# Patient Record
Sex: MALE | Race: WHITE | NOT HISPANIC OR LATINO | Employment: UNEMPLOYED | URBAN - METROPOLITAN AREA
[De-identification: names, ages, dates, MRNs, and addresses within clinical notes are randomized per-mention and may not be internally consistent; named-entity substitution may affect disease eponyms.]

---

## 2017-09-19 ENCOUNTER — HOSPITAL ENCOUNTER (EMERGENCY)
Facility: MEDICAL CENTER | Age: 32
End: 2017-09-20
Attending: EMERGENCY MEDICINE
Payer: MEDICAID

## 2017-09-19 VITALS
BODY MASS INDEX: 24.23 KG/M2 | OXYGEN SATURATION: 96 % | HEIGHT: 71 IN | RESPIRATION RATE: 18 BRPM | WEIGHT: 173.06 LBS | DIASTOLIC BLOOD PRESSURE: 75 MMHG | SYSTOLIC BLOOD PRESSURE: 127 MMHG | HEART RATE: 78 BPM | TEMPERATURE: 99.1 F

## 2017-09-19 DIAGNOSIS — E03.9 ACQUIRED HYPOTHYROIDISM: ICD-10-CM

## 2017-09-19 DIAGNOSIS — E03.9 HYPOTHYROIDISM: ICD-10-CM

## 2017-09-19 PROCEDURE — 99284 EMERGENCY DEPT VISIT MOD MDM: CPT

## 2017-09-19 RX ORDER — LEVOTHYROXINE SODIUM 175 UG/1
175 TABLET ORAL
COMMUNITY
End: 2018-04-07

## 2017-09-19 RX ORDER — LEVOTHYROXINE SODIUM 0.12 MG/1
175 TABLET ORAL
Qty: 30 TAB | Refills: 3 | Status: SHIPPED | OUTPATIENT
Start: 2017-09-19 | End: 2018-04-07

## 2017-09-19 ASSESSMENT — PAIN SCALES - GENERAL: PAINLEVEL_OUTOF10: 0

## 2017-09-20 NOTE — DISCHARGE INSTRUCTIONS
Hypothyroidism  Hypothyroidism is a condition due to under activity of the thyroid gland.  CAUSES   Hypothyroidism may be due to thyroid surgery or disease.   SYMPTOMS   Symptoms are often vague. They may include:  · Fatigue.   · Mental dullness.   · Weakness.   · Hoarseness.   · Constipation.   · Swelling in the face, hands or feet.   · Dry skin.   · Hair loss.   · Sensitivity to cold.   · Menstrual problems.   · Patients with hypothyroidism are also more likely to have problems with infections.   DIAGNOSIS   The diagnosis is confirmed by lab tests for levels of thyroid hormones (TSH, T3, and T4). These tests are also used to monitor treatment.  TREATMENT   Treatment includes the gradual replacement of thyroid hormones. It is very important that you take your thyroid medicine as prescribed daily, even after you begin to feel better. You will probably require thyroid medicine for the rest of your life. To reduce constipation, eat a diet high in fruits and vegetables. Try to get some exercise every day. Please see your doctor for follow up care as recommended so your treatment can be adjusted as your condition improves.   SEEK IMMEDIATE MEDICAL CARE IF:   You have chest pain, palpitations, shortness of breath, or any other serious symptoms.  Document Released: 01/25/2006 Document Revised: 03/11/2013 Document Reviewed: 12/18/2006  Revision3® Patient Information ©2013 Revision3, [facility].

## 2017-09-20 NOTE — DISCHARGE PLANNING
Printed [place] resources for obtaining Medicaid for pt.    Please call SW prior to d/c.

## 2017-09-20 NOTE — ED NOTES
.  Chief Complaint   Patient presents with   • Medication Refill     Ran out of levothyroxine.  Unable to get appt with PCP.  New to [facility].     Patient educated on triage process and wait time.  Instructed to notify staff for worsening or new symptoms.  Placed in lobby.

## 2017-09-20 NOTE — ED PROVIDER NOTES
"ED Provider Note    CHIEF COMPLAINT  Chief Complaint   Patient presents with   • Medication Refill     Ran out of levothyroxine.  Unable to get appt with PCP.  New to [facility].       MARTITA  Neil Mcallister II is a 32 y.o. male who presentsTo the emergency department asking for a refill on his Synthroid. The patient has hypothyroidism and is been out of his medication he's been unable to establish a primary care doctor. He is a symptomatically at this time.    REVIEW OF SYSTEMS  No recent fevers    PHYSICAL EXAM  VITAL SIGNS: /75   Pulse 78   Temp 37.3 °C (99.1 °F) (Temporal)   Resp 18   Ht 1.803 m (5' 11\")   Wt 78.5 kg (173 lb 1 oz)   SpO2 96%   BMI 24.14 kg/m²   In general the patient does not appear toxic  Pulmonary chest clear to auscultation bilaterally  Cardiovascular S1 and S2 with a regular rate and rhythm  GI abdomen soft    COURSE & MEDICAL DECISION MAKING  Pertinent Labs & Imaging studies reviewed. (See chart for details)  This a 32-year-old gentleman who needs a refill on his Synthroid for his hypothyroidism. Therefore a prescription has been generated and the patient be discharged with instructions to establish a primary care provider.    FINAL IMPRESSION  1. Hypothyroidism    Disposition  The patient will be discharged in stable condition      Electronically signed by: Ck Hoover, 9/19/2017 11:15 PM      "

## 2017-09-20 NOTE — ED NOTES
Seen by ERP in Triage 1.  Patient has no medical complaints, just wanted refill of script.     Explained discharge instructions to patient with all questions answered.  Patient encouraged to follow up with PCP and establish care with a PCP, and return to the ER for worsening symptoms.  VSS upon discharge.  Patient ambulated to lobby with steady gait.  Patient confirmed that he had a safe way to get home.

## 2018-03-21 ENCOUNTER — HOSPITAL ENCOUNTER (EMERGENCY)
Dept: HOSPITAL 8 - ED | Age: 33
Discharge: HOME | End: 2018-03-21
Payer: MEDICAID

## 2018-03-21 VITALS — SYSTOLIC BLOOD PRESSURE: 117 MMHG | DIASTOLIC BLOOD PRESSURE: 69 MMHG

## 2018-03-21 VITALS — WEIGHT: 172.4 LBS | HEIGHT: 71 IN | BODY MASS INDEX: 24.14 KG/M2

## 2018-03-21 DIAGNOSIS — K08.89: Primary | ICD-10-CM

## 2018-03-21 DIAGNOSIS — Z88.1: ICD-10-CM

## 2018-03-21 PROCEDURE — 99283 EMERGENCY DEPT VISIT LOW MDM: CPT

## 2018-03-23 ENCOUNTER — HOSPITAL ENCOUNTER (EMERGENCY)
Dept: HOSPITAL 8 - ED | Age: 33
Discharge: HOME | End: 2018-03-23
Payer: MEDICAID

## 2018-03-23 VITALS — DIASTOLIC BLOOD PRESSURE: 73 MMHG | SYSTOLIC BLOOD PRESSURE: 124 MMHG

## 2018-03-23 VITALS — WEIGHT: 171.3 LBS | BODY MASS INDEX: 23.98 KG/M2 | HEIGHT: 71 IN

## 2018-03-23 DIAGNOSIS — K04.7: Primary | ICD-10-CM

## 2018-03-23 DIAGNOSIS — K02.9: ICD-10-CM

## 2018-03-23 DIAGNOSIS — E03.9: ICD-10-CM

## 2018-03-23 PROCEDURE — 96372 THER/PROPH/DIAG INJ SC/IM: CPT

## 2018-03-23 PROCEDURE — 99283 EMERGENCY DEPT VISIT LOW MDM: CPT

## 2018-04-08 ENCOUNTER — HOSPITAL ENCOUNTER (INPATIENT)
Dept: HOSPITAL 8 - ED | Age: 33
LOS: 2 days | Discharge: HOME | DRG: 579 | End: 2018-04-10
Attending: FAMILY MEDICINE | Admitting: INTERNAL MEDICINE
Payer: MEDICAID

## 2018-04-08 VITALS — WEIGHT: 170.86 LBS | BODY MASS INDEX: 23.92 KG/M2 | HEIGHT: 71 IN

## 2018-04-08 VITALS — SYSTOLIC BLOOD PRESSURE: 119 MMHG | DIASTOLIC BLOOD PRESSURE: 75 MMHG

## 2018-04-08 DIAGNOSIS — K04.7: ICD-10-CM

## 2018-04-08 DIAGNOSIS — K02.9: ICD-10-CM

## 2018-04-08 DIAGNOSIS — L03.211: Primary | ICD-10-CM

## 2018-04-08 DIAGNOSIS — F11.10: ICD-10-CM

## 2018-04-08 DIAGNOSIS — H05.019: ICD-10-CM

## 2018-04-08 DIAGNOSIS — N17.0: ICD-10-CM

## 2018-04-08 DIAGNOSIS — K01.1: ICD-10-CM

## 2018-04-08 DIAGNOSIS — E89.0: ICD-10-CM

## 2018-04-08 DIAGNOSIS — K12.2: ICD-10-CM

## 2018-04-08 DIAGNOSIS — Z85.850: ICD-10-CM

## 2018-04-08 DIAGNOSIS — D64.9: ICD-10-CM

## 2018-04-08 DIAGNOSIS — L03.213: ICD-10-CM

## 2018-04-08 DIAGNOSIS — D69.6: ICD-10-CM

## 2018-04-08 LAB
ALBUMIN SERPL-MCNC: 3.3 G/DL (ref 3.4–5)
ALP SERPL-CCNC: 66 U/L (ref 45–117)
ALT SERPL-CCNC: 15 U/L (ref 12–78)
ANION GAP SERPL CALC-SCNC: 7 MMOL/L (ref 5–15)
BASOPHILS # BLD AUTO: 0.05 X10^3/UL (ref 0–0.1)
BASOPHILS NFR BLD AUTO: 1 % (ref 0–1)
BILIRUB SERPL-MCNC: 0.5 MG/DL (ref 0.2–1)
CALCIUM SERPL-MCNC: 8.1 MG/DL (ref 8.5–10.1)
CHLORIDE SERPL-SCNC: 107 MMOL/L (ref 98–107)
CREAT SERPL-MCNC: 2.13 MG/DL (ref 0.7–1.3)
EOSINOPHIL # BLD AUTO: 0.14 X10^3/UL (ref 0–0.4)
EOSINOPHIL NFR BLD AUTO: 2 % (ref 1–7)
ERYTHROCYTE [DISTWIDTH] IN BLOOD BY AUTOMATED COUNT: 12.8 % (ref 9.4–14.8)
LYMPHOCYTES # BLD AUTO: 0.98 X10^3/UL (ref 1–3.4)
LYMPHOCYTES NFR BLD AUTO: 15 % (ref 22–44)
MCH RBC QN AUTO: 30.3 PG (ref 27.5–34.5)
MCHC RBC AUTO-ENTMCNC: 34.7 G/DL (ref 33.2–36.2)
MCV RBC AUTO: 87.1 FL (ref 81–97)
MD: NO
MONOCYTES # BLD AUTO: 0.69 X10^3/UL (ref 0.2–0.8)
MONOCYTES NFR BLD AUTO: 11 % (ref 2–9)
NEUTROPHILS # BLD AUTO: 4.53 X10^3/UL (ref 1.8–6.8)
NEUTROPHILS NFR BLD AUTO: 71 % (ref 42–75)
PLATELET # BLD AUTO: 129 X10^3/UL (ref 130–400)
PMV BLD AUTO: 8.6 FL (ref 7.4–10.4)
PROT SERPL-MCNC: 6.6 G/DL (ref 6.4–8.2)
RBC # BLD AUTO: 3.98 X10^6/UL (ref 4.38–5.82)

## 2018-04-08 PROCEDURE — 80048 BASIC METABOLIC PNL TOTAL CA: CPT

## 2018-04-08 PROCEDURE — 96375 TX/PRO/DX INJ NEW DRUG ADDON: CPT

## 2018-04-08 PROCEDURE — 70481 CT ORBIT/EAR/FOSSA W/DYE: CPT

## 2018-04-08 PROCEDURE — 36415 COLL VENOUS BLD VENIPUNCTURE: CPT

## 2018-04-08 PROCEDURE — 80076 HEPATIC FUNCTION PANEL: CPT

## 2018-04-08 PROCEDURE — 85025 COMPLETE CBC W/AUTO DIFF WBC: CPT

## 2018-04-08 PROCEDURE — 96361 HYDRATE IV INFUSION ADD-ON: CPT

## 2018-04-08 PROCEDURE — 70486 CT MAXILLOFACIAL W/O DYE: CPT

## 2018-04-08 PROCEDURE — 70100 X-RAY EXAM OF JAW <4VIEWS: CPT

## 2018-04-08 PROCEDURE — 41800 DRAINAGE OF GUM LESION: CPT

## 2018-04-08 PROCEDURE — 96365 THER/PROPH/DIAG IV INF INIT: CPT

## 2018-04-08 PROCEDURE — 80053 COMPREHEN METABOLIC PANEL: CPT

## 2018-04-08 RX ADMIN — SODIUM CHLORIDE SCH MLS/HR: 0.9 INJECTION, SOLUTION INTRAVENOUS at 21:58

## 2018-04-08 RX ADMIN — AMPICILLIN SODIUM AND SULBACTAM SODIUM SCH MLS/HR: 2; 1 INJECTION, POWDER, FOR SOLUTION INTRAMUSCULAR; INTRAVENOUS at 21:58

## 2018-04-08 RX ADMIN — ACETAMINOPHEN PRN MG: 325 TABLET, FILM COATED ORAL at 23:27

## 2018-04-09 VITALS — DIASTOLIC BLOOD PRESSURE: 77 MMHG | SYSTOLIC BLOOD PRESSURE: 127 MMHG

## 2018-04-09 VITALS — SYSTOLIC BLOOD PRESSURE: 111 MMHG | DIASTOLIC BLOOD PRESSURE: 58 MMHG

## 2018-04-09 VITALS — SYSTOLIC BLOOD PRESSURE: 112 MMHG | DIASTOLIC BLOOD PRESSURE: 73 MMHG

## 2018-04-09 VITALS — SYSTOLIC BLOOD PRESSURE: 121 MMHG | DIASTOLIC BLOOD PRESSURE: 73 MMHG

## 2018-04-09 VITALS — SYSTOLIC BLOOD PRESSURE: 124 MMHG | DIASTOLIC BLOOD PRESSURE: 65 MMHG

## 2018-04-09 LAB
ALBUMIN SERPL-MCNC: 3 G/DL (ref 3.4–5)
ALP SERPL-CCNC: 69 U/L (ref 45–117)
ALT SERPL-CCNC: 27 U/L (ref 12–78)
ANION GAP SERPL CALC-SCNC: 8 MMOL/L (ref 5–15)
BASOPHILS # BLD AUTO: 0.02 X10^3/UL (ref 0–0.1)
BASOPHILS NFR BLD AUTO: 0 % (ref 0–1)
BILIRUB SERPL-MCNC: 0.5 MG/DL (ref 0.2–1)
CALCIUM SERPL-MCNC: 8 MG/DL (ref 8.5–10.1)
CHLORIDE SERPL-SCNC: 109 MMOL/L (ref 98–107)
CREAT SERPL-MCNC: 1.51 MG/DL (ref 0.7–1.3)
EOSINOPHIL # BLD AUTO: 0.14 X10^3/UL (ref 0–0.4)
EOSINOPHIL NFR BLD AUTO: 2 % (ref 1–7)
ERYTHROCYTE [DISTWIDTH] IN BLOOD BY AUTOMATED COUNT: 12.9 % (ref 9.4–14.8)
LYMPHOCYTES # BLD AUTO: 1.03 X10^3/UL (ref 1–3.4)
LYMPHOCYTES NFR BLD AUTO: 16 % (ref 22–44)
MCH RBC QN AUTO: 29.9 PG (ref 27.5–34.5)
MCHC RBC AUTO-ENTMCNC: 34.5 G/DL (ref 33.2–36.2)
MCV RBC AUTO: 86.8 FL (ref 81–97)
MD: NO
MONOCYTES # BLD AUTO: 0.77 X10^3/UL (ref 0.2–0.8)
MONOCYTES NFR BLD AUTO: 12 % (ref 2–9)
NEUTROPHILS # BLD AUTO: 4.32 X10^3/UL (ref 1.8–6.8)
NEUTROPHILS NFR BLD AUTO: 69 % (ref 42–75)
PLATELET # BLD AUTO: 120 X10^3/UL (ref 130–400)
PMV BLD AUTO: 9 FL (ref 7.4–10.4)
PROT SERPL-MCNC: 6.4 G/DL (ref 6.4–8.2)
RBC # BLD AUTO: 3.92 X10^6/UL (ref 4.38–5.82)

## 2018-04-09 PROCEDURE — 0W930ZZ DRAINAGE OF ORAL CAVITY AND THROAT, OPEN APPROACH: ICD-10-PCS | Performed by: DENTIST

## 2018-04-09 PROCEDURE — 0CDXXZ1 EXTRACTION OF LOWER TOOTH, MULTIPLE, EXTERNAL APPROACH: ICD-10-PCS | Performed by: DENTIST

## 2018-04-09 PROCEDURE — 0NUV0KZ SUPPLEMENT LEFT MANDIBLE WITH NONAUTOLOGOUS TISSUE SUBSTITUTE, OPEN APPROACH: ICD-10-PCS | Performed by: DENTIST

## 2018-04-09 RX ADMIN — DOCUSATE SODIUM 50MG AND SENNOSIDES 8.6MG SCH TAB: 8.6; 5 TABLET, FILM COATED ORAL at 09:55

## 2018-04-09 RX ADMIN — AMPICILLIN SODIUM AND SULBACTAM SODIUM SCH MLS/HR: 2; 1 INJECTION, POWDER, FOR SOLUTION INTRAMUSCULAR; INTRAVENOUS at 03:30

## 2018-04-09 RX ADMIN — AMPICILLIN SODIUM AND SULBACTAM SODIUM SCH MLS/HR: 2; 1 INJECTION, POWDER, FOR SOLUTION INTRAMUSCULAR; INTRAVENOUS at 15:37

## 2018-04-09 RX ADMIN — ACETAMINOPHEN PRN MG: 325 TABLET, FILM COATED ORAL at 21:30

## 2018-04-09 RX ADMIN — ACETAMINOPHEN PRN MG: 325 TABLET, FILM COATED ORAL at 03:31

## 2018-04-09 RX ADMIN — SODIUM CHLORIDE SCH MLS/HR: 0.9 INJECTION, SOLUTION INTRAVENOUS at 14:30

## 2018-04-09 RX ADMIN — FENTANYL CITRATE PRN MCG: 50 INJECTION INTRAMUSCULAR; INTRAVENOUS at 19:10

## 2018-04-09 RX ADMIN — BUPRENORPHINE HYDROCHLORIDE, NALOXONE HYDROCHLORIDE SCH EACH: 8; 2 FILM, SOLUBLE BUCCAL; SUBLINGUAL at 09:57

## 2018-04-09 RX ADMIN — FENTANYL CITRATE PRN MCG: 50 INJECTION INTRAMUSCULAR; INTRAVENOUS at 19:35

## 2018-04-09 RX ADMIN — AMPICILLIN SODIUM AND SULBACTAM SODIUM SCH MLS/HR: 2; 1 INJECTION, POWDER, FOR SOLUTION INTRAMUSCULAR; INTRAVENOUS at 09:55

## 2018-04-09 RX ADMIN — SODIUM CHLORIDE SCH MLS/HR: 0.9 INJECTION, SOLUTION INTRAVENOUS at 03:30

## 2018-04-09 RX ADMIN — LEVOTHYROXINE SODIUM SCH MCG: 200 TABLET ORAL at 09:56

## 2018-04-09 RX ADMIN — FENTANYL CITRATE PRN MCG: 50 INJECTION INTRAMUSCULAR; INTRAVENOUS at 19:18

## 2018-04-09 RX ADMIN — FENTANYL CITRATE PRN MCG: 50 INJECTION INTRAMUSCULAR; INTRAVENOUS at 19:23

## 2018-04-09 RX ADMIN — AMPICILLIN SODIUM AND SULBACTAM SODIUM SCH MLS/HR: 2; 1 INJECTION, POWDER, FOR SOLUTION INTRAMUSCULAR; INTRAVENOUS at 21:29

## 2018-04-09 RX ADMIN — CHLORHEXIDINE GLUCONATE SCH ML: 1.2 RINSE ORAL at 21:22

## 2018-04-09 RX ADMIN — OXYCODONE HYDROCHLORIDE PRN MG: 5 TABLET ORAL at 22:19

## 2018-04-10 VITALS — SYSTOLIC BLOOD PRESSURE: 114 MMHG | DIASTOLIC BLOOD PRESSURE: 55 MMHG

## 2018-04-10 VITALS — DIASTOLIC BLOOD PRESSURE: 64 MMHG | SYSTOLIC BLOOD PRESSURE: 112 MMHG

## 2018-04-10 VITALS — SYSTOLIC BLOOD PRESSURE: 124 MMHG | DIASTOLIC BLOOD PRESSURE: 61 MMHG

## 2018-04-10 LAB
ANION GAP SERPL CALC-SCNC: 8 MMOL/L (ref 5–15)
CALCIUM SERPL-MCNC: 8 MG/DL (ref 8.5–10.1)
CHLORIDE SERPL-SCNC: 108 MMOL/L (ref 98–107)
CREAT SERPL-MCNC: 1 MG/DL (ref 0.7–1.3)

## 2018-04-10 RX ADMIN — OXYCODONE HYDROCHLORIDE PRN MG: 5 TABLET ORAL at 11:01

## 2018-04-10 RX ADMIN — ACETAMINOPHEN PRN MG: 325 TABLET, FILM COATED ORAL at 02:50

## 2018-04-10 RX ADMIN — AMPICILLIN SODIUM AND SULBACTAM SODIUM SCH MLS/HR: 2; 1 INJECTION, POWDER, FOR SOLUTION INTRAMUSCULAR; INTRAVENOUS at 02:52

## 2018-04-10 RX ADMIN — BUPRENORPHINE HYDROCHLORIDE, NALOXONE HYDROCHLORIDE SCH EACH: 8; 2 FILM, SOLUBLE BUCCAL; SUBLINGUAL at 09:00

## 2018-04-10 RX ADMIN — ACETAMINOPHEN PRN MG: 325 TABLET, FILM COATED ORAL at 13:24

## 2018-04-10 RX ADMIN — SODIUM CHLORIDE SCH MLS/HR: 0.9 INJECTION, SOLUTION INTRAVENOUS at 02:52

## 2018-04-10 RX ADMIN — CHLORHEXIDINE GLUCONATE SCH ML: 1.2 RINSE ORAL at 09:52

## 2018-04-10 RX ADMIN — OXYCODONE HYDROCHLORIDE PRN MG: 5 TABLET ORAL at 04:27

## 2018-04-10 RX ADMIN — DOCUSATE SODIUM 50MG AND SENNOSIDES 8.6MG SCH TAB: 8.6; 5 TABLET, FILM COATED ORAL at 09:48

## 2018-04-10 RX ADMIN — ACETAMINOPHEN PRN MG: 325 TABLET, FILM COATED ORAL at 08:23

## 2018-04-10 RX ADMIN — LEVOTHYROXINE SODIUM SCH MCG: 200 TABLET ORAL at 04:27

## 2018-04-10 RX ADMIN — SODIUM CHLORIDE SCH MLS/HR: 0.9 INJECTION, SOLUTION INTRAVENOUS at 11:16

## 2018-04-10 RX ADMIN — AMPICILLIN SODIUM AND SULBACTAM SODIUM SCH MLS/HR: 2; 1 INJECTION, POWDER, FOR SOLUTION INTRAMUSCULAR; INTRAVENOUS at 09:54

## 2019-10-07 PROBLEM — F32.9 MAJOR DEPRESSIVE DISORDER: Status: ACTIVE | Noted: 2019-10-07

## 2020-08-24 PROBLEM — F11.90 OPIOID USE DISORDER: Status: ACTIVE | Noted: 2020-08-24

## 2021-06-10 ENCOUNTER — APPOINTMENT (OUTPATIENT)
Dept: RADIOLOGY | Facility: MEDICAL CENTER | Age: 36
End: 2021-06-10
Attending: EMERGENCY MEDICINE
Payer: COMMERCIAL

## 2021-06-10 ENCOUNTER — HOSPITAL ENCOUNTER (EMERGENCY)
Facility: MEDICAL CENTER | Age: 36
End: 2021-06-10
Attending: EMERGENCY MEDICINE
Payer: COMMERCIAL

## 2021-06-10 VITALS
WEIGHT: 175 LBS | TEMPERATURE: 97.8 F | OXYGEN SATURATION: 91 % | HEIGHT: 71 IN | RESPIRATION RATE: 18 BRPM | SYSTOLIC BLOOD PRESSURE: 110 MMHG | DIASTOLIC BLOOD PRESSURE: 58 MMHG | HEART RATE: 61 BPM | BODY MASS INDEX: 24.5 KG/M2

## 2021-06-10 DIAGNOSIS — L08.9: ICD-10-CM

## 2021-06-10 DIAGNOSIS — S30.851A: ICD-10-CM

## 2021-06-10 PROCEDURE — 307744 HCHG RED TRAUMA TEAM SERVICES

## 2021-06-10 PROCEDURE — 96375 TX/PRO/DX INJ NEW DRUG ADDON: CPT

## 2021-06-10 PROCEDURE — 700111 HCHG RX REV CODE 636 W/ 250 OVERRIDE (IP): Performed by: EMERGENCY MEDICINE

## 2021-06-10 PROCEDURE — 99285 EMERGENCY DEPT VISIT HI MDM: CPT

## 2021-06-10 PROCEDURE — 73501 X-RAY EXAM HIP UNI 1 VIEW: CPT | Mod: RT

## 2021-06-10 PROCEDURE — 96365 THER/PROPH/DIAG IV INF INIT: CPT

## 2021-06-10 RX ORDER — ACETAMINOPHEN 500 MG
1000 TABLET ORAL EVERY 8 HOURS
Qty: 60 TABLET | Refills: 0 | Status: SHIPPED | OUTPATIENT
Start: 2021-06-10 | End: 2021-06-20

## 2021-06-10 RX ORDER — NAPROXEN 500 MG/1
500 TABLET ORAL 2 TIMES DAILY WITH MEALS
Qty: 60 TABLET | Refills: 0 | Status: SHIPPED | OUTPATIENT
Start: 2021-06-10 | End: 2021-06-10 | Stop reason: SDUPTHER

## 2021-06-10 RX ORDER — ACETAMINOPHEN 500 MG
1000 TABLET ORAL EVERY 8 HOURS
Qty: 60 TABLET | Refills: 0 | Status: SHIPPED | OUTPATIENT
Start: 2021-06-10 | End: 2021-06-10 | Stop reason: SDUPTHER

## 2021-06-10 RX ORDER — NAPROXEN 500 MG/1
500 TABLET ORAL 2 TIMES DAILY WITH MEALS
Qty: 60 TABLET | Refills: 0 | Status: SHIPPED | OUTPATIENT
Start: 2021-06-10 | End: 2022-05-02

## 2021-06-10 RX ORDER — CEFAZOLIN SODIUM 2 G/100ML
INJECTION, SOLUTION INTRAVENOUS
Status: COMPLETED | OUTPATIENT
Start: 2021-06-10 | End: 2021-06-10

## 2021-06-10 RX ORDER — KETOROLAC TROMETHAMINE 30 MG/ML
INJECTION, SOLUTION INTRAMUSCULAR; INTRAVENOUS
Status: COMPLETED | OUTPATIENT
Start: 2021-06-10 | End: 2021-06-10

## 2021-06-10 RX ADMIN — KETOROLAC TROMETHAMINE 30 MG: 30 INJECTION, SOLUTION INTRAMUSCULAR at 12:56

## 2021-06-10 RX ADMIN — CEFAZOLIN SODIUM 2 G: 2 INJECTION, SOLUTION INTRAVENOUS at 12:54

## 2021-06-10 ASSESSMENT — LIFESTYLE VARIABLES: DO YOU DRINK ALCOHOL: NO

## 2021-06-10 NOTE — LETTER
"  FORM C-4:  EMPLOYEE’S CLAIM FOR COMPENSATION/ REPORT OF INITIAL TREATMENT  EMPLOYEE’S CLAIM - PROVIDE ALL INFORMATION REQUESTED   First Name Neil Last Name Esvin Birthdate 1985  Sex male Claim Number   Home Address [address]   [facility] [facility]             [place] [address]                                   Age  36 y.o. Height  1.803 m (5' 11\") Weight  79.4 kg (175 lb) N  [id number]   Mailing Address [address]  [facility]              Zip [address] Telephone  [phone] (home)  Primary Language Spoken   Insurer  *** Third Party   CHELSIE MONTELONGO Employee's Occupation (Job Title) When Injury or Occupational Disease Occurred  [employer]   Employer's Name  Telephone [phone]    Employer Address [address] [facility] [address]] Zip [address]   Date of Injury  6/10/2021       Hour of Injury  12:30 PM Date Employer Notified  6/10/2021 Last Day of Work after Injury or Occupational Disease  6/10/2021 Supervisor to Whom Injury Reported  Cory Hoffman   Address or Location of Accident (if applicable) Work [1]   What were you doing at the time of accident? (if applicable) Nailing in [place]    How did this injury or occupational disease occur? Be specific and answer in detail. Use additional sheet if necessary)  i droped the nail gun and when I caught it I accidently Pulled the trigger   If you believe that you have an occupational disease, when did you first have knowledge of the disability and it relationship to your employment? N/A Witnesses to the Accident  George   Nature of Injury or Occupational Disease  Workers' Compensation Part(s) of Body Injured or Affected  Abdomen Including Groin, Soft Tissue, N/A    I CERTIFY THAT THE ABOVE IS TRUE AND CORRECT TO THE BEST OF MY KNOWLEDGE AND THAT I HAVE PROVIDED THIS INFORMATION IN ORDER TO OBTAIN THE BENEFITS OF [place] " OR CHAPTER [place]).  I HEREBY AUTHORIZE ANY PHYSICIAN, CHIROPRACTOR, SURGEON, PRACTITIONER, OR OTHER PERSON, ANY HOSPITAL, INCLUDING [facility] OR [facility], ANY MEDICAL SERVICE ORGANIZATION, ANY INSURANCE COMPANY, OR OTHER INSTITUTION OR ORGANIZATION TO RELEASE TO EACH OTHER, ANY MEDICAL OR OTHER INFORMATION, INCLUDING BENEFITS PAID OR PAYABLE, PERTINENT TO THIS INJURY OR DISEASE, EXCEPT INFORMATION RELATIVE TO DIAGNOSIS, TREATMENT AND/OR COUNSELING FOR AIDS, PSYCHOLOGICAL CONDITIONS, ALCOHOL OR CONTROLLED SUBSTANCES, FOR WHICH I MUST GIVE SPECIFIC AUTHORIZATION.  A PHOTOSTAT OF THIS AUTHORIZATION SHALL BE AS VALID AS THE ORIGINAL.  Date                                      Place                                                                             Employee’s Signature   THIS REPORT MUST BE COMPLETED AND MAILED WITHIN 3 WORKING DAYS OF TREATMENT   Place [facility], EMERGENCY DEPT                       Name of Facility [facility]   Date  6/10/2021 Diagnosis  (S30.851A,  L08.9) Superficial foreign body groin without major open wound with infection, initial encounter Is there evidence the injured employee was under the influence of alcohol and/or another controlled substance at the time of accident?   Hour  1:46 PM Description of Injury or Disease  Superficial foreign body groin without major open wound with infection, initial encounter     Treatment     Have you advised the patient to remain off work five days or more?             X-Ray Findings    If Yes   From Date    To Date      From information given by the employee, together with medical evidence, can you directly connect this injury or occupational disease as job incurred?   If No, is employee capable of: Full Duty    Modified Duty      Is additional medical care by a physician indicated?   If Modified Duty, Specify any Limitations / Restrictions       Do you know of any previous injury  "or disease contributing to this condition or occupational disease?      Date 6/10/2021 Print Doctor’s Name Gilda Rendon I certify the employer’s copy of this form was mailed on:   Address [address] [place] [address]  [place] [address] [phone] INSURER’S USE ONLY   Provider’s Tax ID Number   Telephone Dept: [phone]    Doctor’s Signature   Degree        Form C-4 (rev.10/07)                                                                         BRIEF DESCRIPTION OF RIGHTS AND BENEFITS  (Pursuant to [place] 616C.050)    Notice of Injury or Occupational Disease (Incident Report Form C-1): If an injury or occupational disease (OD) arises out of and in the course of employment, you must provide written notice to your employer as soon as practicable, but no later than 7 days after the accident or OD. Your employer shall maintain a sufficient supply of the required forms.    Claim for Compensation (Form C-4): If medical treatment is sought, the form C-4 is available at the place of initial treatment. A completed \"Claim for Compensation\" (Form C-4) must be filed within 90 days after an accident or OD. The treating physician or chiropractor must, within 3 working days after treatment, complete and mail to the employer, the employer's insurer and third-party , the Claim for Compensation.    Medical Treatment: If you require medical treatment for your on-the-job injury or OD, you may be required to select a physician or chiropractor from a list provided by your workers’ compensation insurer, if it has contracted with an Organization for Managed Care (MCO) or Preferred Provider Organization (PPO) or providers of health care. If your employer has not entered into a contract with an MCO or PPO, you may select a physician or chiropractor from the Panel of Physicians and Chiropractors. Any medical costs related to your industrial injury or OD will be paid by your insurer.    Temporary Total Disability (TTD): If your doctor " has certified that you are unable to work for a period of at least 5 consecutive days, or 5 cumulative days in a 20-day period, or places restrictions on you that your employer does not accommodate, you may be entitled to TTD compensation.    Temporary Partial Disability (TPD): If the wage you receive upon reemployment is less than the compensation for TTD to which you are entitled, the insurer may be required to pay you TPD compensation to make up the difference. TPD can only be paid for a maximum of 24 months.    Permanent Partial Disability (PPD): When your medical condition is stable and there is an indication of a PPD as a result of your injury or OD, within 30 days, your insurer must arrange for an evaluation by a rating physician or chiropractor to determine the degree of your PPD. The amount of your PPD award depends on the date of injury, the results of the PPD evaluation, your age and wage.    Permanent Total Disability (PTD): If you are medically certified by a treating physician or chiropractor as permanently and totally disabled and have been granted a PTD status by your insurer, you are entitled to receive monthly benefits not to exceed 66 2/3% of your average monthly wage. The amount of your PTD payments is subject to reduction if you previously received a lump-sum PPD award.    Vocational Rehabilitation Services: You may be eligible for vocational rehabilitation services if you are unable to return to the job due to a permanent physical impairment or permanent restrictions as a result of your injury or occupational disease.    Transportation and Per Brie Reimbursement: You may be eligible for travel expenses and per brie associated with medical treatment.    Reopening: You may be able to reopen your claim if your condition worsens after claim closure.     Appeal Process: If you disagree with a written determination issued by the insurer or the insurer does not respond to your request, you may appeal  to the Department of Administration, , by following the instructions contained in your determination letter. You must appeal the determination within 70 days from the date of the determination letter at [address], [address], [place], [place] [address], or [address], [address], [place], [place] [address]. If you disagree with the  decision, you may appeal to the Department of Administration, . You must file your appeal within 30 days from the date of the  decision letter at [address], [address], [place], [place] [address], or [address], [address], [place], [place] [address]. If you disagree with a decision of an , you may file a petition for judicial review with the District Court. You must do so within 30 days of the Appeal Officer’s decision. You may be represented by an  at your own expense or you may contact the [facility] for possible representation.    [place]  for Injured Workers (NAIW): If you disagree with a  decision, you may request that NAIW represent you without charge at an  Hearing. For information regarding denial of benefits, you may contact the [facility] at: [address], [address], [place] [address], [phone], or [address], [address], [place] [address], [phone]    To File a Complaint with the Division: If you wish to file a complaint with the  of the Division of Industrial Relations (DIR),  please contact the Workers’ Compensation Section, [address], [address], [place], [place] [address], telephone [phone], or [address], [address], [place], [place] [address], telephone [phone].    For assistance with Workers’ Compensation Issues: You may contact the [facility] Office for Consumer Health Assistance, [address], [address], [place], [place] [address],  Toll Free 1-266.205.7332, Web site: http://[facility].[place].gov/Programs/DELPHINE E-mail: delphine@[facility].[place].gov  D-2 (rev. 10/20)              __________________________________________________________________                                    _________________            Employee Name / Signature                                                                                                                            Date

## 2021-06-10 NOTE — ED PROVIDER NOTES
ED Provider Note    Scribed for Gilda Rendon M.D. by Ana Pearson. 6/10/2021, 12:43 PM.    Primary care provider: FANNY Bhakta  Means of arrival: Walk-in  History obtained from: Patient  History limited by: None    CHIEF COMPLAINT  Chief Complaint   Patient presents with    Trauma Red     nail gun to R thigh     HPI  Neil Mcallister II is a 36 y.o. male who presents as a trauma red to the Emergency Department. The patient was using a nail gun and discharged a Lionel nail into his right groin. The nail is still in his groin. Denies any other injuries. His tetanus was lasted updated 3 years ago. Denies tobacco, alcohol, or drug use. Denies allergies to medications.     REVIEW OF SYSTEMS  Musculoskeletal: Foreign body in right groin. No numbness or tingling to the right leg. No bleeding or hematoma    See history of present illness. All other systems are negative.    PAST MEDICAL HISTORY   has a past medical history of Anxiety and depression, Cancer ([facility]), Cellulitis of hand, Dermatitis, GERD (gastroesophageal reflux disease), H/O: substance abuse ([facility]) (2014), History of thyroid cancer, Injury of left foot, Parathyroid disorder ([facility]), Postsurgical hypothyroidism, Right foot injury, Snoring, and Staph infection.    SURGICAL HISTORY   has a past surgical history that includes thyroidectomy; finger or hand incision and drainage (11/28/2013); irrigation & debridement ortho (3/6/2014); incision and drainage orthopedic (3/18/2014); incision and drainage orthopedic (3/20/2014); irrigation & debridement ortho (4/22/2014); incision and drainage general (11/4/2014); abdominal exploration; eye surgery; open reduction; and arthroplasty.    SOCIAL HISTORY  Social History     Tobacco Use    Smoking status: Former Smoker     Packs/day: 0.50     Years: 5.00     Pack years: 2.50     Types: Cigarettes    Smokeless tobacco: Former User     Types: Chew   Vaping Use    Vaping Use: Every day   Substance Use Topics     "Alcohol use: No    Drug use: Not Currently     Types: Methamphetamines     Comment: Heroin      Social History     Substance and Sexual Activity   Drug Use Not Currently    Types: Methamphetamines    Comment: Heroin     FAMILY HISTORY  Family History   Problem Relation Age of Onset    Alcohol/Drug Father         sober     CURRENT MEDICATIONS  Home Medications       Reviewed by Annie Triplett R.N. (Registered Nurse) on 06/10/21 at 1303  Med List Status: Partial     Medication Last Dose Status   levothyroxine (SYNTHROID) 200 MCG Tab  Active   Naloxone (NARCAN) 4 MG/0.1ML Liquid  Active   omeprazole (PRILOSEC) 20 MG Tablet Delayed Response delayed-release tablet  Active                  ALLERGIES  Allergies   Allergen Reactions    Erythromycin Rash     rash    Vicodin [Hydrocodone-Acetaminophen] Rash     Rash      Azithromycin Itching     Turn red.  States the said he got red man syndrome         PHYSICAL EXAM  VITAL SIGNS: /82   Pulse 82   Temp 36.6 °C (97.8 °F) (Temporal)   Resp 20   Ht 1.803 m (5' 11\")   Wt 79.4 kg (175 lb)   SpO2 92%   BMI 24.41 kg/m²     Constitutional: GCS 15, ABC's intact   Eyes: PERRLA, EOMI, Conjunctiva normal, No discharge.   Cardiovascular: Normal heart rate, Normal rhythm, No murmurs, No rubs, No gallops.   Thorax & Lungs: Normal breath sounds, No respiratory distress, No wheezing, No chest tenderness. No subcutaneous emphysema or paradoxical chest wall movements  Abdomen: Bowel sounds normal, Soft, No tenderness, No masses, No pulsatile masses, no abdominal wall contusions  Skin: Warm, Dry, No erythema, No rash no contusions or abrasions small puncture wound to right groin that is not bleeding  Extremities: Intact distal pulses, No edema, No tenderness, No cyanosis, No clubbing.   Musculoskeletal: Small puncture wound to the lateral anterior thigh, below the inguinal area. Anterior thigh otherwise normal. No expanding hematoma. No hard vascular signs. Distal CSM intake.  "   Neurologic: Alert & oriented x 3, Normal motor function, Normal sensory function, No focal deficits noted. Normal strength.     RADIOLOGY  DX-HIP-UNILATERAL-WITH PELVIS-1 VIEW RIGHT   Final Result      1.  Linear metallic foreign body along the anterior lateral aspect of the hip at the level of the greater trochanter.      US-ABORTED US PROCEDURE    (Results Pending)     The radiologist's interpretation of all radiological studies have been reviewed by me.    COURSE & MEDICAL DECISION MAKING  Pertinent Labs & Imaging studies reviewed. (See chart for details)    12:43 PM - Patient seen and examined in the trauma bay. Patient will be treated with Toradol injection and Ancef IVPB. Ordered US of right groin and DX-Hip Unilateral-Right to evaluate his symptoms. X-ray of the pelvis and thigh showed foreign body.     12:57 PM - Per discussion with trauma surgeon and the patient, the nail will stay in.     1:00 PM - Discussed home meds with the patient at this time. I discussed plan of discharge and strict return precautions for any new or worsening symptoms. The patient verbalizes agreement and understands.    The patient will return for new or worsening symptoms and is stable at the time of discharge.    The patient is referred to a primary physician for blood pressure management, diabetic screening, and for all other preventative health concerns.    DISPOSITION:  Patient will be discharged home in stable condition.    FOLLOW UP:  Paulino Lomax M.D.  [address] [place] [id number] [phone]    In 1 week  As needed, If symptoms worsen    OUTPATIENT MEDICATIONS:  Discharge Medication List as of 6/10/2021  2:10 PM        START taking these medications    Details   naproxen (NAPROSYN) 500 MG Tab Take 1 tablet by mouth 2 times a day with meals., Disp-60 tablet, R-0, Normal      acetaminophen (TYLENOL) 500 MG Tab Take 2 Tablets by mouth every 8 hours for 10 days., Disp-60 tablet, R-0, Normal           FINAL  IMPRESSION  1. Superficial foreign body groin without major open wound with infection, initial encounter        Ana LIRA (Scribe), am scribing for, and in the presence of, Gilda Rendon M.D..    Electronically signed by: Ana Pearson (Scribroxy), 6/10/2021    Gilda LIRA M.D. personally performed the services described in this documentation, as scribed by Ana Pearson in my presence, and it is both accurate and complete.    C.     The note accurately reflects work and decisions made by me.  Gilda Rendon M.D.  6/10/2021  4:04 PM

## 2021-06-10 NOTE — ED TRIAGE NOTES
Neil Mcallister II  36 y.o.  Chief Complaint   Patient presents with   • Trauma Red     nail gun to R thigh     Ambulatory to ED with steady gait for above. A & O x 4, GCS 15. Mask in place.    Injuries:  -R upper medial thigh wound open to air, bleeding controlled PTA with direct pressure    RLE CMS intact.    Tdap up to date per patient.

## 2021-06-10 NOTE — LETTER
"  FORM C-4:  EMPLOYEE’S CLAIM FOR COMPENSATION/ REPORT OF INITIAL TREATMENT  EMPLOYEE’S CLAIM - PROVIDE ALL INFORMATION REQUESTED   First Name Neil Last Name Esvin Birthdate 1985  Sex male Claim Number   Home Address [address]   [facility]             Zip [address]                                   Age  36 y.o. Height  1.803 m (5' 11\") Weight  79.4 kg (175 lb) [facility]     Mailing Address [address]  [facility]              Zip [address] Telephone  [phone] (home)  Primary Language Spoken  English   Insurer   Third Party    Employee's Occupation (Job Title) When Injury or Occupational Disease Occurred  [employer]   Employer's Name [employer] [place] Telephone [phone]    Employer Address [address] [facility] [address]] Zip [address]   Date of Injury  6/10/2021       Hour of Injury  12:30 PM Date Employer Notified  6/10/2021 Last Day of Work after Injury or Occupational Disease  6/10/2021 Supervisor to Whom Injury Reported  Cory Hoffman   Address or Location of Accident (if applicable) Work [1]   What were you doing at the time of accident? (if applicable) Nailing in [place]    How did this injury or occupational disease occur? Be specific and answer in detail. Use additional sheet if necessary)  i droped the nail gun and when I caught it I accidently Pulled the trigger   If you believe that you have an occupational disease, when did you first have knowledge of the disability and it relationship to your employment? N/A Witnesses to the Accident  George   Nature of Injury or Occupational Disease  Workers' Compensation Part(s) of Body Injured or Affected  Abdomen Including Groin, Soft Tissue, N/A    I CERTIFY THAT THE ABOVE IS TRUE AND CORRECT TO THE BEST OF MY KNOWLEDGE AND THAT I HAVE PROVIDED THIS INFORMATION IN ORDER TO OBTAIN THE BENEFITS OF [place]).  I HEREBY AUTHORIZE ANY PHYSICIAN, CHIROPRACTOR, SURGEON, PRACTITIONER, OR OTHER PERSON, ANY HOSPITAL, INCLUDING [facility] OR [facility], ANY MEDICAL SERVICE ORGANIZATION, ANY INSURANCE COMPANY, OR OTHER INSTITUTION OR ORGANIZATION TO RELEASE TO EACH OTHER, ANY MEDICAL OR OTHER INFORMATION, INCLUDING BENEFITS PAID OR PAYABLE, PERTINENT TO THIS INJURY OR DISEASE, EXCEPT INFORMATION RELATIVE TO DIAGNOSIS, TREATMENT AND/OR COUNSELING FOR AIDS, PSYCHOLOGICAL CONDITIONS, ALCOHOL OR CONTROLLED SUBSTANCES, FOR WHICH I MUST GIVE SPECIFIC AUTHORIZATION.  A PHOTOSTAT OF THIS AUTHORIZATION SHALL BE AS VALID AS THE ORIGINAL.  Date   06.10.2021                          Place             [facility]           Employee’s Signature   THIS REPORT MUST BE COMPLETED AND MAILED WITHIN 3 WORKING DAYS OF TREATMENT   Place [facility], EMERGENCY DEPT                       Name of Facility [facility]   Date  6/10/2021 Diagnosis  (S30.851A,  L08.9) Superficial foreign body groin without major open wound with infection, initial encounter Is there evidence the injured employee was under the influence of alcohol and/or another controlled substance at the time of accident?   Hour  2:05 PM Description of Injury or Disease  Superficial foreign body groin without major open wound with infection, initial encounter No   Treatment  Xray, ancef, toradol  Have you advised the patient to remain off work five days or more?         No   X-Ray Findings  Positive If Yes   From Date    To Date      From information given by the employee, together with medical evidence, can you directly connect this injury or occupational disease as job incurred? Yes If No, is employee capable of: Full Duty  No Modified Duty  Yes   Is additional medical care by a physician indicated? Yes  Comments:occ health If Modified Duty, Specify any Limitations /  "Restrictions   decreased weight bearing right leg   Do you know of any previous injury or disease contributing to this condition or occupational disease? No    Date 6/10/2021 Print Doctor’s Name Janett Rendon I certify the employer’s copy of this form was mailed on:   Address [address] [place] [address]  [employer] [id number] [phone] INSURER’S USE ONLY   Provider’s Tax ID Number   Telephone Dept: [phone]    Doctor’s Signature e-JANETT Malave M.D. Degree  M.D      Form C-4 (rev.10/07)                                                                         BRIEF DESCRIPTION OF RIGHTS AND BENEFITS  (Pursuant to [place] 616[place])    Notice of Injury or Occupational Disease (Incident Report Form C-1): If an injury or occupational disease (OD) arises out of and in the course of employment, you must provide written notice to your employer as soon as practicable, but no later than 7 days after the accident or OD. Your employer shall maintain a sufficient supply of the required forms.    Claim for Compensation (Form C-4): If medical treatment is sought, the form C-4 is available at the place of initial treatment. A completed \"Claim for Compensation\" (Form C-4) must be filed within 90 days after an accident or OD. The treating physician or chiropractor must, within 3 working days after treatment, complete and mail to the employer, the employer's insurer and third-party , the Claim for Compensation.    Medical Treatment: If you require medical treatment for your on-the-job injury or OD, you may be required to select a physician or chiropractor from a list provided by your workers’ compensation insurer, if it has contracted with an Organization for Managed Care (MCO) or Preferred Provider Organization (PPO) or providers of health care. If your employer has not entered into a contract with an MCO or PPO, you may select a physician or chiropractor from the Panel of Physicians and Chiropractors. Any medical costs related " to your industrial injury or OD will be paid by your insurer.    Temporary Total Disability (TTD): If your doctor has certified that you are unable to work for a period of at least 5 consecutive days, or 5 cumulative days in a 20-day period, or places restrictions on you that your employer does not accommodate, you may be entitled to TTD compensation.    Temporary Partial Disability (TPD): If the wage you receive upon reemployment is less than the compensation for TTD to which you are entitled, the insurer may be required to pay you TPD compensation to make up the difference. TPD can only be paid for a maximum of 24 months.    Permanent Partial Disability (PPD): When your medical condition is stable and there is an indication of a PPD as a result of your injury or OD, within 30 days, your insurer must arrange for an evaluation by a rating physician or chiropractor to determine the degree of your PPD. The amount of your PPD award depends on the date of injury, the results of the PPD evaluation, your age and wage.    Permanent Total Disability (PTD): If you are medically certified by a treating physician or chiropractor as permanently and totally disabled and have been granted a PTD status by your insurer, you are entitled to receive monthly benefits not to exceed 66 2/3% of your average monthly wage. The amount of your PTD payments is subject to reduction if you previously received a lump-sum PPD award.    Vocational Rehabilitation Services: You may be eligible for vocational rehabilitation services if you are unable to return to the job due to a permanent physical impairment or permanent restrictions as a result of your injury or occupational disease.    Transportation and Per Brie Reimbursement: You may be eligible for travel expenses and per brie associated with medical treatment.    Reopening: You may be able to reopen your claim if your condition worsens after claim closure.     Appeal Process: If you disagree  with a written determination issued by the insurer or the insurer does not respond to your request, you may appeal to the Department of Administration, , by following the instructions contained in your determination letter. You must appeal the determination within 70 days from the date of the determination letter at [address], [address], [place], [place] [address], or [address], [address], [place], [place] [address]. If you disagree with the  decision, you may appeal to the Department of Administration, . You must file your appeal within 30 days from the date of the  decision letter at [address], [address], [place], [place] [address], or [address], [address], [place], [place] [address]. If you disagree with a decision of an , you may file a petition for judicial review with the District Court. You must do so within 30 days of the Appeal Officer’s decision. You may be represented by an  at your own expense or you may contact the [facility] for possible representation.    [place]  for Injured Workers (NAIW): If you disagree with a  decision, you may request that NAIW represent you without charge at an  Hearing. For information regarding denial of benefits, you may contact the [facility] at: [address], [address], [place] [address], [phone], or [address], [address], [place] [address], [phone]    To File a Complaint with the Division: If you wish to file a complaint with the  of the Division of Industrial Relations (DIR),  please contact the Workers’ Compensation Section, [address], [place], [place] [address], telephone [phone], or [address], [address], [place], [place] [address], telephone [phone].    For assistance with Workers’ Compensation Issues: You may contact the  [facility][1] Office for Consumer Health Assistance, [address] [facility] [facility], [address], [place] [address], Toll Free 1-222.620.4714, Web site: http://[facility] access hospital.[place].gov/Programs/DELPHINE E-mail: [email or web address]@[email or web address].nv.gov  D-2 (rev. 10/20)              __________________________________________________________________                                    _06.10.2021_________            Employee Name / Signature                                                                                                                            Date

## 2021-06-10 NOTE — H&P
Trauma Surgery History and Physical    Chief Complaint: Nail gun injury.     History of Present Illness: The patient is a 36 year-old White man who accidentally fired an 18 gauge courtney nail into his lateral right groin . He complains of pain the right lateral groin region.    Triage Category: The patient was triaged as a Trauma Red activation. An expeditious primary and secondary survey with required adjuncts was conducted. See Trauma Narrator for full details.    Past Medical History:  has a past medical history of Anxiety and depression, Cancer ([facility]), Cellulitis of hand, Dermatitis, GERD (gastroesophageal reflux disease), H/O: substance abuse ([facility]) (2014), History of thyroid cancer, Injury of left foot, Parathyroid disorder ([facility]), Postsurgical hypothyroidism, Right foot injury, Snoring, and Staph infection. He also has no past medical history of Addisons disease ([facility]), Adrenal disorder ([facility]), Allergy, Anemia, Anesthesia, Arrhythmia, Arthritis, ASTHMA, Blood transfusion without reported diagnosis, Breath shortness, Bronchitis, CA in situ resp sys, CATARACT, Clotting disorder ([facility]), Cold, Congestive heart failure ([facility]), COPD (chronic obstructive pulmonary disease) ([facility]), Coughing blood, Cushings syndrome ([facility]), Dental disorder, Diabetes, Diabetic neuropathy ([facility]), Dialysis, EMPHYSEMA, Glaucoma, Goiter, Head ache, Heart murmur, Heart valve disease, Hepatitis A, Hepatitis B, Hepatitis C, Hiatus hernia syndrome, HIV (human immunodeficiency virus infection) ([facility]), Hyperlipidemia, Hypertension, IBD (inflammatory bowel disease), Meningitis, Migraine, Muscle disorder, Myocardial infarct ([facility]), Obstruction, Osteoporosis, Other specified disorder of intestines, Personal history of venous thrombosis and embolism, Pituitary disease ([facility]), Pneumonia, Renal disorder, Rheumatic fever, Seizure ([facility]), Sickle cell disease ([facility]), Sleep apnea, Stroke ([facility]), Tuberculosis, Unspecified hemorrhagic conditions, Unspecified urinary  "incontinence, Urinary bladder disorder, or Urinary tract infection.    Past Surgical History:  has a past surgical history that includes thyroidectomy; finger or hand incision and drainage (11/28/2013); irrigation & debridement ortho (3/6/2014); incision and drainage orthopedic (3/18/2014); incision and drainage orthopedic (3/20/2014); irrigation & debridement ortho (4/22/2014); incision and drainage general (11/4/2014); abdominal exploration; eye surgery; open reduction; and arthroplasty.    Allergies:   Allergies   Allergen Reactions   • Erythromycin Rash     rash   • Vicodin [Hydrocodone-Acetaminophen] Rash     Rash     • Azithromycin Itching     Turn red.  States the said he got red man syndrome           Current Medications:    Home Medications     Reviewed by Annie Triplett R.N. (Registered Nurse) on 06/10/21 at 1303  Med List Status: Partial   Medication Last Dose Status   levothyroxine (SYNTHROID) 200 MCG Tab  Active   Naloxone (NARCAN) 4 MG/0.1ML Liquid  Active   omeprazole (PRILOSEC) 20 MG Tablet Delayed Response delayed-release tablet  Active                Family History: family history includes Alcohol/Drug in his father.    Social History:  reports that he has quit smoking. His smoking use included cigarettes. He has a 2.50 pack-year smoking history. He has quit using smokeless tobacco.  His smokeless tobacco use included chew. He reports previous drug use. Drug: Methamphetamines. He reports that he does not drink alcohol.    Review of Systems: Comprehensive review of systems is negative with the exception of the aforementioned HPI, PMH, and PSH bullets in accordance with CMS guidelines.    Physical Examination:     Constitutional:     Vital Signs: /82   Pulse 82   Temp 36.6 °C (97.8 °F) (Temporal)   Resp 20   Ht 1.803 m (5' 11\")   Wt 79.4 kg (175 lb)   SpO2 92%    General Appearance: The patient is a uncomfortable-appearing man in no critical distress.  HEENT:    No significant external " craniofacial trauma. The pupils are equal, round, and reactive to light bilaterally. The extraocular muscles are intact bilaterally. The ear canals and tympanic membranes are clear bilaterally. The nares and oropharynx are clear. The midface and jaw are stable.  No malocclusion is evident.  Neck:    No posterior midline cervical-spine tenderness, no evidence of intoxication, normal level of alertness ([place] Coma Scale 15), no focal neurologic deficit, and no painful distracting injuries. The trachea is midline. No significant abrasions, lacerations, contusions, punctures, or swelling. No crepitance. No jugulovenous distension.  Respiratory:   Inspection: Unlabored respirations, no intercostal retractions, paradoxical motion, or accessory muscle use.   Palpation:  The chest is nontender. The clavicles are non deformed bilaterally.   Auscultation: clear to auscultation.  Cardiovascular:   Inspection: The skin is warm, dry and well purfused.  Auscultation: Regular rate and rhythm.   Peripheral Pulses: Normal. No hard vascular signs.   Abdomen:   Inspection: Abdominal inspection reveals no abrasions, contusions, lacerations or penetrating wounds.   Palpation: Palpation is remarkable for no significant tenderness, guarding, or peritoneal findings.  Genitourinary:   (MALE): normal male external genitalia.  Musculoskeletal:   The pelvis is stable. No significant angulation, deformity, or soft tissue injury involving the upper and lower extremities. Pinpoint courtney nail entry in the lateral right groin. No hematoma or bruit.  Back:   The thoracolumbar spine was examined utilizing spinal motion restriction. Examination is remarkable for no significant tenderness, swelling, or deformity in the thoracolumbar region.  Skin:    Examination of the skin reveals no significant abrasions, contusion, lacerations, or other soft tissue injury.  Neurologic:    [place] Coma Scale (GCS) Eye Opening (spontaneous 4), Verbal Response  (oriented 5), Best Motor Response (obeys commands 6). GCS 15.    Neurologic examination reveals no focal deficits noted, mental status intact, cranial nerves II through XII intact, muscle tone normal, muscle strength normal, sensation is normal.  Psychiatric:   The patient oriented to time, place, person, judgement and insight appear intact.    Imaging:   DX-HIP-UNILATERAL-WITH PELVIS-1 VIEW RIGHT   Final Result      1.  Linear metallic foreign body along the anterior lateral aspect of the hip at the level of the greater trochanter.      US-ABORTED US PROCEDURE    (Results Pending)       Assessment and Plan:     Small foreign body, right lateral groin without neurovascular compromise.   Non operative management unless the patient develops signs of infection or persistent focal discomfort.    Disposition: Home.       ____________________________________     Paulino Lomax M.D.    DD: 6/10/2021  1:35 PM

## 2022-04-26 PROBLEM — S83.511A NEW ACL TEAR, RIGHT, INITIAL ENCOUNTER: Status: ACTIVE | Noted: 2022-04-26

## 2023-07-05 PROBLEM — G47.33 OBSTRUCTIVE SLEEP APNEA SYNDROME: Status: ACTIVE | Noted: 2021-04-13

## 2023-07-05 PROBLEM — K21.9 GASTROESOPHAGEAL REFLUX DISEASE WITHOUT ESOPHAGITIS: Status: ACTIVE | Noted: 2023-03-28

## 2023-07-05 PROBLEM — M70.31 BURSITIS OF RIGHT ELBOW: Status: ACTIVE | Noted: 2023-05-22

## 2023-07-05 PROBLEM — F90.2 ATTENTION DEFICIT HYPERACTIVITY DISORDER (ADHD), COMBINED TYPE: Status: ACTIVE | Noted: 2023-03-28

## 2023-07-05 PROBLEM — E53.8 VITAMIN B12 DEFICIENCY (NON ANEMIC): Status: ACTIVE | Noted: 2021-08-12

## 2023-07-05 PROBLEM — E55.9 VITAMIN D INSUFFICIENCY: Status: ACTIVE | Noted: 2021-08-12

## 2023-07-05 PROBLEM — M25.50 PAIN IN JOINT, MULTIPLE SITES: Status: ACTIVE | Noted: 2023-07-05

## 2023-07-05 PROBLEM — Z87.891 HISTORY OF TOBACCO USE: Status: ACTIVE | Noted: 2021-04-13

## 2024-05-06 ENCOUNTER — HOSPITAL ENCOUNTER (OUTPATIENT)
Dept: RADIOLOGY | Facility: MEDICAL CENTER | Age: 39
End: 2024-05-06
Attending: PHYSICIAN ASSISTANT
Payer: COMMERCIAL

## 2024-05-06 DIAGNOSIS — M25.562 ACUTE PAIN OF LEFT KNEE: ICD-10-CM

## 2024-05-06 DIAGNOSIS — M25.362 INSTABILITY OF LEFT KNEE JOINT: ICD-10-CM
